# Patient Record
(demographics unavailable — no encounter records)

---

## 2025-05-20 NOTE — REASON FOR VISIT
[FreeTextEntry2] :  05/20/2025:  52-year-old male here today for: new patient L elbow pain. He states when he goes to grab objects there is a sharp pain within the elbow. Pain started in December 2024 and has gotten worse. Denies seeking prior treatment.

## 2025-05-20 NOTE — PHYSICAL EXAM
[de-identified] : L elbow: mild swelling lateral elbow Tender lateral epicondyle Pain with ROM Pain with forced wrist extension  Todays Xrays PA/Lateral/Oblique of the L elbow shows no OA

## 2025-05-20 NOTE — ASSESSMENT
[FreeTextEntry1] : Lateral epicondylitis is an acute complicated injury. Treatment options for epicondylitis includes OTC NSAIDS, prescription NSAIDS, ice, OT, cortisone injection, PRP, and surgical repair.  I am prescribing Mobic 15mg Daily to alleviate pain and inflammation. Patient advised to take for 1-2 weeks. They were advised of risks of medication including but not limited GI upset and high blood pressure.  MRI to eval for tear Return after MRI

## 2025-05-20 NOTE — HISTORY OF PRESENT ILLNESS
[6] : 6 [2] : 2 [Dull/Aching] : dull/aching [de-identified] : L elbow pain since Dec 2024 Lateral sided pain  He has tried therapy, NSIADS, brace and ice with out releif   Fishing and tennis [FreeTextEntry1] : L elbow

## 2025-06-10 NOTE — ASSESSMENT
[FreeTextEntry1] : Lateral epicondylitis is an acute complicated injury. Treatment options for epicondylitis includes OTC NSAIDS, prescription NSAIDS, ice, OT, cortisone injection, PRP, and surgical repair.  I prescribed PT 2-3 times a week for 4-6 weeks  L lateral epicondyle tendon origin injection was performed because of pain and inflammation under aseptic conditions with betadine and alcohol Anesthesia: ethyl chloride sprayed topically Celestone 6mg/1cc: An injection of Celestone 2 cc Lidocaine: An injection of Lidocaine 1% 2 cc Marcaine: An injection of Marcaine 0.5% 2 cc 25G needle     The risks, benefits, and alternatives to cortisone injection were explained in full to the patient. Risks outlined include but are not limited to infection, sepsis, bleeding, scarring, skin discoloration, temporary increase in pain, syncopal episode, failure to resolve symptoms, allergic reaction, symptom recurrence, and elevation of blood sugar in diabetics. Patient understood the risks. All questions were answered. After discussion of options, patient verbally consented to an injection. Sterile prep was done of the injection site. Patient tolerated the procedure well. Advised to ice the injection site this evening.   Return in 4 weeks

## 2025-06-10 NOTE — REASON FOR VISIT
[FreeTextEntry2] :  06/10/2025:  52 year old male here today for: follow up L elbow pain and Lateral epicondylitis, he had an MRI on 6/5/25 at [facility]

## 2025-06-10 NOTE — HISTORY OF PRESENT ILLNESS
[2] : 2 [Dull/Aching] : dull/aching [de-identified] : L elbow pain since Dec 2024 Lateral sided pain  Mobic has been helpful   For the first time I independently reviewed the MRI of the L elbow done at [facility] on 6/5/2025 The clinically relevant findings show mild partial tearing of the ECRB    [4] : 4 [FreeTextEntry1] : L elbow

## 2025-06-10 NOTE — PHYSICAL EXAM
[de-identified] : L elbow: mild swelling lateral elbow Tender lateral epicondyle Pain with ROM Pain with forced wrist extension